# Patient Record
(demographics unavailable — no encounter records)

---
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
## 2017-08-05 NOTE — CAT SCAN REPORT
FINAL REPORT



EXAM:  CT CERVICAL SPINE WO CON



HISTORY:  eval 



TECHNIQUE:  Spiral CT scanning of the cervical spine, with axial

images and multiplanar reformations.



PRIORS:  None.



FINDINGS:  

Multilevel degenerative disc disease and spondylosis, primarily

in lower cervical spine. 



No acute compression deformity or gross malalignment of cervical

vertebral bodies. No acute fracture identified. No acute, osseous

central spinal canal encroachment. Paraspinal soft tissues

grossly unremarkable. 



IMPRESSION:  

1. No acute compression deformity or apparent fracture in the

cervical spine. 



2. Degenerative spondylosis.

## 2017-08-05 NOTE — CAT SCAN REPORT
FINAL REPORT



EXAM:  CT HEAD/BRAIN WO CON



HISTORY:  seizure 



TECHNIQUE:  Noncontrast CT axial images of the brain. 



PRIORS:  None.



FINDINGS:  

No parenchymal mass, mass effect, hemorrhage, midline shift or

hydrocephalus. No evidence of acute cortical infarct. No

abnormal, extra-axial fluid or air collection. 



Osseous calvarium grossly intact.



IMPRESSION:  

1. No acute intracranial findings.